# Patient Record
Sex: FEMALE | Race: OTHER | URBAN - METROPOLITAN AREA
[De-identification: names, ages, dates, MRNs, and addresses within clinical notes are randomized per-mention and may not be internally consistent; named-entity substitution may affect disease eponyms.]

---

## 2022-07-09 ENCOUNTER — TELEPHONE ENCOUNTER (OUTPATIENT)
Dept: URBAN - METROPOLITAN AREA CLINIC 121 | Facility: CLINIC | Age: 57
End: 2022-07-09

## 2022-07-09 RX ORDER — LEVOTHYROXINE SODIUM 100 UG/1
TABLET ORAL
Refills: 0 | OUTPATIENT
Start: 2018-01-31 | End: 2018-04-17

## 2022-07-09 RX ORDER — LISINOPRIL AND HYDROCHLOROTHIAZIDE TABLETS 10; 12.5 MG/1; MG/1
TABLET ORAL
Refills: 0 | OUTPATIENT
Start: 2018-04-17 | End: 2018-12-10

## 2022-07-09 RX ORDER — LISINOPRIL AND HYDROCHLOROTHIAZIDE TABLETS 10; 12.5 MG/1; MG/1
TABLET ORAL
Refills: 0 | OUTPATIENT
Start: 2018-02-19 | End: 2018-04-17

## 2022-07-10 ENCOUNTER — TELEPHONE ENCOUNTER (OUTPATIENT)
Dept: URBAN - METROPOLITAN AREA CLINIC 121 | Facility: CLINIC | Age: 57
End: 2022-07-10

## 2022-07-10 RX ORDER — BIMATOPROST 0.1 MG/ML
SOLUTION/ DROPS OPHTHALMIC
Refills: 0 | Status: ACTIVE | COMMUNITY
Start: 2018-11-20

## 2022-07-10 RX ORDER — KRILL/OM-3/DHA/EPA/PHOSPHO/AST 1000-230MG
CAPSULE ORAL
Refills: 0 | Status: ACTIVE | COMMUNITY
Start: 2018-12-04

## 2022-07-10 RX ORDER — LISINOPRIL AND HYDROCHLOROTHIAZIDE TABLETS 10; 12.5 MG/1; MG/1
TABLET ORAL
Refills: 0 | Status: ACTIVE | COMMUNITY
Start: 2018-12-10

## 2022-07-10 RX ORDER — LEVOTHYROXINE SODIUM 100 UG/1
TABLET ORAL
Refills: 0 | Status: ACTIVE | COMMUNITY
Start: 2018-04-17

## 2022-07-10 RX ORDER — LEVOTHYROXINE SODIUM 88 UG/1
TABLET ORAL
Refills: 0 | Status: ACTIVE | COMMUNITY
Start: 2018-11-14

## 2022-07-10 RX ORDER — ESTROGEN,CON/M-PROGEST ACET 0.3-1.5MG
TABLET ORAL
Refills: 0 | Status: ACTIVE | COMMUNITY
Start: 2018-11-16

## 2022-12-12 ENCOUNTER — APPOINTMENT (RX ONLY)
Dept: URBAN - METROPOLITAN AREA CLINIC 147 | Facility: CLINIC | Age: 57
Setting detail: DERMATOLOGY
End: 2022-12-12

## 2022-12-12 DIAGNOSIS — L81.1 CHLOASMA: ICD-10-CM

## 2022-12-12 PROCEDURE — 99203 OFFICE O/P NEW LOW 30 MIN: CPT

## 2022-12-12 PROCEDURE — ? TREATMENT REGIMEN

## 2022-12-12 PROCEDURE — ? DEFER

## 2022-12-12 PROCEDURE — ? COUNSELING

## 2022-12-12 PROCEDURE — ? ADDITIONAL NOTES

## 2022-12-12 PROCEDURE — ? SUNSCREEN RECOMMENDATIONS

## 2022-12-12 ASSESSMENT — LOCATION SIMPLE DESCRIPTION DERM
LOCATION SIMPLE: LEFT CHEEK
LOCATION SIMPLE: RIGHT CHEEK

## 2022-12-12 ASSESSMENT — LOCATION ZONE DERM: LOCATION ZONE: FACE

## 2022-12-12 ASSESSMENT — LOCATION DETAILED DESCRIPTION DERM
LOCATION DETAILED: LEFT INFERIOR CENTRAL MALAR CHEEK
LOCATION DETAILED: RIGHT INFERIOR CENTRAL MALAR CHEEK

## 2022-12-12 NOTE — PROCEDURE: DEFER
Introduction Text (Please End With A Colon): The following procedure was deferred:
Instructions (Optional): Margaret Sheikh for laser
Detail Level: Detailed
Size Of Lesion In Cm (Optional): 0
Procedure To Be Performed At Next Visit: Other

## 2022-12-12 NOTE — HPI: SKIN LESIONS
[No Acute Distress] : no acute distress
[No Respiratory Distress] : no respiratory distress 
[Normal Affect] : the affect was normal
[Normal Insight/Judgement] : insight and judgment were intact
[de-identified] : Audio only.
How Severe Is Your Skin Lesion?: mild
Have Your Skin Lesions Been Treated?: not been treated
Is This A New Presentation, Or A Follow-Up?: Skin Lesions

## 2022-12-12 NOTE — PROCEDURE: SUNSCREEN RECOMMENDATIONS
Products Recommended: Elta MD tinted Clear sunscreen daily, use a physical sunscreen with zinc or titanium
Detail Level: Generalized
General Sunscreen Counseling: I recommended a broad spectrum sunscreen with a SPF of 30 or higher.  I explained that SPF 30 sunscreens block approximately 97 percent of the sun's harmful rays.  Sunscreens should be applied at least 15 minutes prior to expected sun exposure and then every 2 hours after that as long as sun exposure continues. If swimming or exercising sunscreen should be reapplied every 45 minutes to an hour after getting wet or sweating.  One ounce, or the equivalent of a shot glass full of sunscreen, is adequate to protect the skin not covered by a bathing suit. I also recommended a lip balm with a sunscreen as well. Sun protective clothing can be used in lieu of sunscreen but must be worn the entire time you are exposed to the sun's rays.

## 2022-12-12 NOTE — PROCEDURE: ADDITIONAL NOTES
Additional Notes: Pt finish hydroquinone 3 months ago
Render Risk Assessment In Note?: no
Detail Level: Zone

## 2022-12-16 ENCOUNTER — OFFICE VISIT (OUTPATIENT)
Dept: URBAN - METROPOLITAN AREA CLINIC 60 | Facility: CLINIC | Age: 57
End: 2022-12-16
Payer: COMMERCIAL

## 2022-12-16 VITALS
HEART RATE: 76 BPM | RESPIRATION RATE: 20 BRPM | BODY MASS INDEX: 28.32 KG/M2 | HEIGHT: 61 IN | DIASTOLIC BLOOD PRESSURE: 80 MMHG | WEIGHT: 150 LBS | OXYGEN SATURATION: 96 % | TEMPERATURE: 97.6 F | SYSTOLIC BLOOD PRESSURE: 124 MMHG

## 2022-12-16 DIAGNOSIS — K29.60 REFLUX GASTRITIS: ICD-10-CM

## 2022-12-16 DIAGNOSIS — Z12.11 COLON CANCER SCREENING: ICD-10-CM

## 2022-12-16 DIAGNOSIS — K76.0 FATTY LIVER: ICD-10-CM

## 2022-12-16 DIAGNOSIS — Z01.818 ENCOUNTER FOR OTHER PREPROCEDURAL EXAMINATION: ICD-10-CM

## 2022-12-16 PROBLEM — 197321007 FATTY LIVER: Status: ACTIVE | Noted: 2022-12-16

## 2022-12-16 PROCEDURE — 99204 OFFICE O/P NEW MOD 45 MIN: CPT | Performed by: NURSE PRACTITIONER

## 2022-12-16 RX ORDER — BIMATOPROST 0.1 MG/ML
SOLUTION/ DROPS OPHTHALMIC
Refills: 0 | Status: ACTIVE | COMMUNITY
Start: 2018-11-20

## 2022-12-16 RX ORDER — OMEPRAZOLE 40 MG/1
1 CAPSULE 30 MINUTES BEFORE MORNING MEAL CAPSULE, DELAYED RELEASE ORAL ONCE A DAY
Qty: 30 | Refills: 2 | OUTPATIENT
Start: 2022-12-16

## 2022-12-16 RX ORDER — LISINOPRIL AND HYDROCHLOROTHIAZIDE TABLETS 10; 12.5 MG/1; MG/1
TABLET ORAL
Refills: 0 | Status: ACTIVE | COMMUNITY
Start: 2018-12-10

## 2022-12-16 RX ORDER — OMEPRAZOLE 40 MG/1
1 CAPSULE 30 MINUTES BEFORE MORNING MEAL CAPSULE, DELAYED RELEASE ORAL ONCE A DAY
Status: ACTIVE | COMMUNITY

## 2022-12-16 RX ORDER — LEVOTHYROXINE SODIUM 100 UG/1
TABLET ORAL
Refills: 0 | Status: ACTIVE | COMMUNITY
Start: 2018-04-17

## 2022-12-16 NOTE — HPI-HPI
2/18 Patient comes with RUQ pain radiated to her back with high intensity, with a duration of 30 minutes. The pain is intermittent for the last 3 years, Had EGD and colonoscopy by Dr Rodriguez,I do not have the results will ask for records. Only was order omeprazole. Will start Dexilant. And will do Ultrasound and HIDA scan will do referral to Dr Paul CT scan done on May 2017 showed cholelithiasis as possible cause of her pain. Also had adnexal cyst lesion (3.6 by 3.5 cm right adnexal cyst). Will do labs to check LFT and lipase. Now patient is asymptomatic.  4/18:  Patient had HIDA scan which is normal but ultrasound of the abdomen showed fatty liver and cholelithiasis, likely biliary colic pain associated was evaluated by Dr Paul, had cholecystectomy with resolution of the pain, with evidence of a chronic cholecystitis and cholelithiasis in the pathology as per patient. Will need to do diet and exercise to start in few weeks, will follow in 6 months to evaluate  her fatty liver. 12/18:  Patient had Ultrasound with evidence of s/p cholecystectomy, fatty liver. Will do labs needs diet and weight loss. Patient lost 6 lbs since last visit 2 months ago had labs and LFT were normal. Will follow in 6 months. 12/22 12/22 Patient is here today complaining of having symptoms of gastritis and reflux. Also, for her liver follow-up. Patient has medical history of fatty liver, her last follow-up was 4 years ago. Also, requesting screening colonoscopy her last colonoscopy was done more than 6 years ago as per her report had a recall in 5-year.  Patient denies any rectal bleeding or change in her bowel habits. Liver work-up, EGD, colonoscopy, diet and a trial  With PPI.

## 2022-12-23 ENCOUNTER — LAB OUTSIDE AN ENCOUNTER (OUTPATIENT)
Dept: URBAN - METROPOLITAN AREA CLINIC 60 | Facility: CLINIC | Age: 57
End: 2022-12-23

## 2023-01-30 ENCOUNTER — TELEPHONE ENCOUNTER (OUTPATIENT)
Dept: URBAN - METROPOLITAN AREA CLINIC 63 | Facility: CLINIC | Age: 58
End: 2023-01-30

## 2023-02-09 ENCOUNTER — OFFICE VISIT (OUTPATIENT)
Dept: URBAN - METROPOLITAN AREA SURGERY CENTER 4 | Facility: SURGERY CENTER | Age: 58
End: 2023-02-09

## 2023-03-03 ENCOUNTER — OFFICE VISIT (OUTPATIENT)
Dept: URBAN - METROPOLITAN AREA SURGERY CENTER 4 | Facility: SURGERY CENTER | Age: 58
End: 2023-03-03

## 2023-03-03 PROBLEM — 57433008 REFLUX GASTRITIS: Status: ACTIVE | Noted: 2022-12-16

## 2023-03-17 ENCOUNTER — OFFICE VISIT (OUTPATIENT)
Dept: URBAN - METROPOLITAN AREA CLINIC 60 | Facility: CLINIC | Age: 58
End: 2023-03-17

## 2023-06-20 ENCOUNTER — WEB ENCOUNTER (OUTPATIENT)
Dept: URBAN - METROPOLITAN AREA CLINIC 60 | Facility: CLINIC | Age: 58
End: 2023-06-20

## 2023-06-20 ENCOUNTER — OFFICE VISIT (OUTPATIENT)
Dept: URBAN - METROPOLITAN AREA CLINIC 60 | Facility: CLINIC | Age: 58
End: 2023-06-20
Payer: COMMERCIAL

## 2023-06-20 VITALS
BODY MASS INDEX: 28.7 KG/M2 | DIASTOLIC BLOOD PRESSURE: 80 MMHG | HEIGHT: 61 IN | HEART RATE: 81 BPM | WEIGHT: 152 LBS | OXYGEN SATURATION: 98 % | RESPIRATION RATE: 20 BRPM | TEMPERATURE: 98.2 F | SYSTOLIC BLOOD PRESSURE: 120 MMHG

## 2023-06-20 DIAGNOSIS — K76.0 FATTY LIVER: ICD-10-CM

## 2023-06-20 DIAGNOSIS — K29.60 REFLUX GASTRITIS: ICD-10-CM

## 2023-06-20 DIAGNOSIS — Z12.11 COLON CANCER SCREENING: ICD-10-CM

## 2023-06-20 PROCEDURE — 99214 OFFICE O/P EST MOD 30 MIN: CPT | Performed by: NURSE PRACTITIONER

## 2023-06-20 RX ORDER — LEVOTHYROXINE SODIUM 100 UG/1
TABLET ORAL
Refills: 0 | Status: ACTIVE | COMMUNITY
Start: 2018-04-17

## 2023-06-20 RX ORDER — LISINOPRIL AND HYDROCHLOROTHIAZIDE TABLETS 10; 12.5 MG/1; MG/1
TABLET ORAL
Refills: 0 | Status: ACTIVE | COMMUNITY
Start: 2018-12-10

## 2023-06-20 RX ORDER — OMEPRAZOLE 20 MG/1
1 CAPSULE 30 MINUTES BEFORE MORNING MEAL CAPSULE, DELAYED RELEASE ORAL ONCE A DAY
Qty: 30 | Refills: 2 | OUTPATIENT
Start: 2023-06-20

## 2023-06-20 RX ORDER — BIMATOPROST 0.1 MG/ML
SOLUTION/ DROPS OPHTHALMIC
Refills: 0 | Status: ACTIVE | COMMUNITY
Start: 2018-11-20

## 2023-06-20 RX ORDER — SUCRALFATE 1 G/1
1 TABLET ON AN EMPTY STOMACH TABLET ORAL TWICE A DAY
Status: ACTIVE | COMMUNITY

## 2023-06-20 RX ORDER — OMEPRAZOLE 40 MG/1
1 CAPSULE 30 MINUTES BEFORE MORNING MEAL CAPSULE, DELAYED RELEASE ORAL ONCE A DAY
Qty: 30 | Refills: 2 | Status: ACTIVE | COMMUNITY
Start: 2022-12-16

## 2023-06-20 NOTE — HPI-HPI
2/18 Patient comes with RUQ pain radiated to her back with high intensity, with a duration of 30 minutes. The pain is intermittent for the last 3 years, Had EGD and colonoscopy by Dr Rodriguez, I do not have the results will ask for records. Only was order omeprazole. Will start Dexilant. And will do Ultrasound and HIDA scan will do referral to Dr Paul CT scan done on May 2017 showed cholelithiasis as possible cause of her pain. Also had adnexal cyst lesion (3.6 by 3.5 cm right adnexal cyst). Will do labs to check LFT and lipase. Now patient is asymptomatic.  4/18:  Patient had HIDA scan which is normal but ultrasound of the abdomen showed fatty liver and cholelithiasis, likely biliary colic pain associated was evaluated by Dr Paul, had cholecystectomy with resolution of the pain, with evidence of a chronic cholecystitis and cholelithiasis in the pathology as per patient. Will need to do diet and exercise to start in few weeks, will follow in 6 months to evaluate  her fatty liver. 12/18:  Patient had Ultrasound with evidence of s/p cholecystectomy, fatty liver. Will do labs needs diet and weight loss. Patient lost 6 lbs since last visit 2 months ago had labs and LFT were normal. Will follow in 6 months. 12/22 12/22 Patient is here today complaining of having symptoms of gastritis and reflux. Also, for her liver follow-up. Patient has medical history of fatty liver, her last follow-up was 4 years ago. Also, requesting screening colonoscopy her last colonoscopy was done more than 6 years ago as per her report had a recall in 5-year.  Patient denies any rectal bleeding or change in her bowel habits. Liver work-up, EGD, colonoscopy, diet and a trial  With PPI. 6/23 Patient here today for her surveillance colonoscopy.  Last colonoscopy was done more than 6 years ago, she also is complaining of gastritis and reflux.  Her symptoms are chronic but, now seems to be worse. Patient also has medical history of fatty liver. Patient will resume diet and treatment for gastritis and reflux, EGD. Liver work-up. Colonoscopy.

## 2023-06-27 ENCOUNTER — LAB OUTSIDE AN ENCOUNTER (OUTPATIENT)
Dept: URBAN - METROPOLITAN AREA CLINIC 60 | Facility: CLINIC | Age: 58
End: 2023-06-27

## 2023-07-20 ENCOUNTER — LAB OUTSIDE AN ENCOUNTER (OUTPATIENT)
Dept: URBAN - METROPOLITAN AREA CLINIC 63 | Facility: CLINIC | Age: 58
End: 2023-07-20

## 2023-07-21 LAB
A/G RATIO: 1.6
ABSOLUTE BASOPHILS: 21
ABSOLUTE EOSINOPHILS: 59
ABSOLUTE LYMPHOCYTES: 1714
ABSOLUTE MONOCYTES: 399
ABSOLUTE NEUTROPHILS: 2008
ALBUMIN: 4.4
ALKALINE PHOSPHATASE: 120
ALT (SGPT): 29
AST (SGOT): 22
BASOPHILS: 0.5
BILIRUBIN, TOTAL: 0.5
BUN/CREATININE RATIO: (no result)
BUN: 11
CALCIUM: 9.2
CARBON DIOXIDE, TOTAL: 28
CHLORIDE: 106
CHOL/HDLC RATIO: 4
CHOLESTEROL, TOTAL: 198
CREATININE: 0.67
EGFR: 101
EOSINOPHILS: 1.4
GLOBULIN, TOTAL: 2.8
GLUCOSE: 100
HDL CHOLESTEROL: 49
HEMATOCRIT: 30.6
HEMOGLOBIN: 10
INR: 1
LDL CHOLESTEROL CALC: 119
LYMPHOCYTES: 40.8
MCH: 27.2
MCHC: 32.7
MCV: 83.2
MONOCYTES: 9.5
MPV: 11.3
NEUTROPHILS: 47.8
NON HDL CHOLESTEROL: 149
PLATELET COUNT: 248
POTASSIUM: 4.2
PROTEIN, TOTAL: 7.2
PT: 10.3
RDW: 14.9
RED BLOOD CELL COUNT: 3.68
SODIUM: 142
TRIGLYCERIDES: 182
WHITE BLOOD CELL COUNT: 4.2

## 2023-07-25 ENCOUNTER — TELEPHONE ENCOUNTER (OUTPATIENT)
Dept: URBAN - METROPOLITAN AREA CLINIC 63 | Facility: CLINIC | Age: 58
End: 2023-07-25

## 2023-07-27 ENCOUNTER — TELEPHONE ENCOUNTER (OUTPATIENT)
Dept: URBAN - METROPOLITAN AREA CLINIC 63 | Facility: CLINIC | Age: 58
End: 2023-07-27

## 2023-08-04 ENCOUNTER — OUT OF OFFICE VISIT (OUTPATIENT)
Dept: URBAN - METROPOLITAN AREA SURGERY CENTER 4 | Facility: SURGERY CENTER | Age: 58
End: 2023-08-04
Payer: COMMERCIAL

## 2023-08-04 ENCOUNTER — CLAIMS CREATED FROM THE CLAIM WINDOW (OUTPATIENT)
Dept: URBAN - METROPOLITAN AREA CLINIC 4 | Facility: CLINIC | Age: 58
End: 2023-08-04
Payer: COMMERCIAL

## 2023-08-04 DIAGNOSIS — K64.1 GRADE II HEMORRHOIDS: ICD-10-CM

## 2023-08-04 DIAGNOSIS — Z86.010 COLON POLYP HISTORY: ICD-10-CM

## 2023-08-04 DIAGNOSIS — R19.8 OTHER SPECIFIED SYMPTOMS AND SIGNS INVOLVING THE DIGESTIVE SYSTEM AND ABDOMEN: ICD-10-CM

## 2023-08-04 DIAGNOSIS — D12.0 POLYP OF CECUM: ICD-10-CM

## 2023-08-04 DIAGNOSIS — K29.50 CHRONIC GASTRITIS WITHOUT BLEEDING: ICD-10-CM

## 2023-08-04 DIAGNOSIS — K44.9 HIATAL HERNIA: ICD-10-CM

## 2023-08-04 DIAGNOSIS — K21.9 GASTRO-ESOPHAGEAL REFLUX DISEASE WITHOUT ESOPHAGITIS: ICD-10-CM

## 2023-08-04 DIAGNOSIS — K21.9 ESOPHAGEAL REFLUX: ICD-10-CM

## 2023-08-04 DIAGNOSIS — K31.89 OTHER DISEASES OF STOMACH AND DUODENUM: ICD-10-CM

## 2023-08-04 DIAGNOSIS — K62.1 RECTAL POLYP: ICD-10-CM

## 2023-08-04 DIAGNOSIS — Z86.010 ADENOMAS PERSONAL HISTORY OF COLONIC POLYPS: ICD-10-CM

## 2023-08-04 DIAGNOSIS — K63.5 BENIGN COLON POLYPS: ICD-10-CM

## 2023-08-04 PROCEDURE — 00813 ANES UPR LWR GI NDSC PX: CPT | Performed by: NURSE ANESTHETIST, CERTIFIED REGISTERED

## 2023-08-04 PROCEDURE — 88305 TISSUE EXAM BY PATHOLOGIST: CPT | Performed by: PATHOLOGY

## 2023-08-04 PROCEDURE — 45380 COLONOSCOPY AND BIOPSY: CPT | Performed by: INTERNAL MEDICINE

## 2023-08-04 PROCEDURE — 88342 IMHCHEM/IMCYTCHM 1ST ANTB: CPT | Performed by: PATHOLOGY

## 2023-08-04 PROCEDURE — 43239 EGD BIOPSY SINGLE/MULTIPLE: CPT | Performed by: INTERNAL MEDICINE

## 2023-08-04 PROCEDURE — 88312 SPECIAL STAINS GROUP 1: CPT | Performed by: PATHOLOGY

## 2023-08-04 RX ORDER — BIMATOPROST 0.1 MG/ML
SOLUTION/ DROPS OPHTHALMIC
Refills: 0 | Status: ACTIVE | COMMUNITY
Start: 2018-11-20

## 2023-08-04 RX ORDER — LEVOTHYROXINE SODIUM 100 UG/1
TABLET ORAL
Refills: 0 | Status: ACTIVE | COMMUNITY
Start: 2018-04-17

## 2023-08-04 RX ORDER — LISINOPRIL AND HYDROCHLOROTHIAZIDE TABLETS 10; 12.5 MG/1; MG/1
TABLET ORAL
Refills: 0 | Status: ACTIVE | COMMUNITY
Start: 2018-12-10

## 2023-08-04 RX ORDER — OMEPRAZOLE 20 MG/1
1 CAPSULE 30 MINUTES BEFORE MORNING MEAL CAPSULE, DELAYED RELEASE ORAL ONCE A DAY
Qty: 30 | Refills: 2 | Status: ACTIVE | COMMUNITY
Start: 2023-06-20

## 2023-08-04 RX ORDER — OMEPRAZOLE 40 MG/1
1 CAPSULE 30 MINUTES BEFORE MORNING MEAL CAPSULE, DELAYED RELEASE ORAL ONCE A DAY
Qty: 30 | Refills: 2 | Status: ACTIVE | COMMUNITY
Start: 2022-12-16

## 2023-08-04 RX ORDER — SUCRALFATE 1 G/1
1 TABLET ON AN EMPTY STOMACH TABLET ORAL TWICE A DAY
Status: ACTIVE | COMMUNITY

## 2023-08-18 ENCOUNTER — OFFICE VISIT (OUTPATIENT)
Dept: URBAN - METROPOLITAN AREA CLINIC 60 | Facility: CLINIC | Age: 58
End: 2023-08-18
Payer: COMMERCIAL

## 2023-08-18 VITALS
OXYGEN SATURATION: 98 % | DIASTOLIC BLOOD PRESSURE: 74 MMHG | TEMPERATURE: 98.1 F | HEIGHT: 61 IN | RESPIRATION RATE: 20 BRPM | SYSTOLIC BLOOD PRESSURE: 118 MMHG | BODY MASS INDEX: 27.75 KG/M2 | WEIGHT: 147 LBS | HEART RATE: 67 BPM

## 2023-08-18 DIAGNOSIS — D12.6 ADENOMATOUS POLYP OF COLON, UNSPECIFIED PART OF COLON: ICD-10-CM

## 2023-08-18 DIAGNOSIS — K64.1 GRADE II HEMORRHOIDS: ICD-10-CM

## 2023-08-18 DIAGNOSIS — K44.9 HIATAL HERNIA: ICD-10-CM

## 2023-08-18 DIAGNOSIS — K29.50 CHRONIC GASTRITIS WITHOUT BLEEDING, UNSPECIFIED GASTRITIS TYPE: ICD-10-CM

## 2023-08-18 PROBLEM — 8493009: Status: ACTIVE | Noted: 2023-08-18

## 2023-08-18 PROCEDURE — 99214 OFFICE O/P EST MOD 30 MIN: CPT | Performed by: NURSE PRACTITIONER

## 2023-08-18 RX ORDER — LISINOPRIL AND HYDROCHLOROTHIAZIDE TABLETS 10; 12.5 MG/1; MG/1
TABLET ORAL
Refills: 0 | Status: ACTIVE | COMMUNITY
Start: 2018-12-10

## 2023-08-18 RX ORDER — BIMATOPROST 0.1 MG/ML
SOLUTION/ DROPS OPHTHALMIC
Refills: 0 | Status: ACTIVE | COMMUNITY
Start: 2018-11-20

## 2023-08-18 RX ORDER — FAMOTIDINE 20 MG/1
1 TABLET AT BEDTIME AS NEEDED TABLET, FILM COATED ORAL ONCE A DAY
Qty: 90 TABLET | Refills: 0 | OUTPATIENT
Start: 2023-08-18

## 2023-08-18 RX ORDER — LEVOTHYROXINE SODIUM 100 UG/1
TABLET ORAL
Refills: 0 | Status: ACTIVE | COMMUNITY
Start: 2018-04-17

## 2023-08-18 NOTE — HPI-HPI
2/18 Patient comes with RUQ pain radiated to her back with high intensity, with a duration of 30 minutes. The pain is intermittent for the last 3 years, Had EGD and colonoscopy by Dr Rodriguez, I do not have the results will ask for records. Only was order omeprazole. Will start Dexilant. And will do Ultrasound and HIDA scan will do referral to Dr Paul CT scan done on May 2017 showed cholelithiasis as possible cause of her pain. Also had adnexal cyst lesion (3.6 by 3.5 cm right adnexal cyst). Will do labs to check LFT and lipase. Now patient is asymptomatic.  4/18:  Patient had HIDA scan which is normal but ultrasound of the abdomen showed fatty liver and cholelithiasis, likely biliary colic pain associated was evaluated by Dr Paul, had cholecystectomy with resolution of the pain, with evidence of a chronic cholecystitis and cholelithiasis in the pathology as per patient. Will need to do diet and exercise to start in few weeks, will follow in 6 months to evaluate  her fatty liver. 12/18:  Patient had Ultrasound with evidence of s/p cholecystectomy, fatty liver. Will do labs needs diet and weight loss. Patient lost 6 lbs since last visit 2 months ago had labs and LFT were normal. Will follow in 6 months. 12/22 12/22 Patient is here today complaining of having symptoms of gastritis and reflux. Also, for her liver follow-up. Patient has medical history of fatty liver, her last follow-up was 4 years ago. Also, requesting screening colonoscopy her last colonoscopy was done more than 6 years ago as per her report had a recall in 5-year.  Patient denies any rectal bleeding or change in her bowel habits. Liver work-up, EGD, colonoscopy, diet and a trial  With PPI. 6/23 Patient here today for her surveillance colonoscopy.  Last colonoscopy was done more than 6 years ago, she also is complaining of gastritis and reflux.  Her symptoms are chronic but, now seems to be worse. Patient also has medical history of fatty liver. Patient will resume diet and treatment for gastritis and reflux, EGD. Liver work-up. Colonoscopy. 8/23 Shows evidence of a 5 mm tubular adenoma polyp into the cecum, a 5 mm tubular adenoma polyp into the rectum, and internal hemorrhoids.  EGD shows evidence of small hiatal hernia, chronic gastritis, normal esophagus and normal examined duodenum.  Biopsy results duodenal mucosa with no significant abnormality.  Stomach, antrum and body biopsy shows chronic inactive gastritis with some histological changes suggestive of treated H. pylori gastritis.  Negative for H. pylori organism, intestinal metaplasia, dysplasia, or malignancy.  Lower third esophageal mucosa with reflux type changes.  Negative for Merritt esophagus, dysplasia, malignancy. Next colonoscopy in 5-year continue with diet.  DC PPI and Carafate.  Famotidine 20 mg once a day for 4 weeks.

## 2023-11-16 ENCOUNTER — TELEPHONE ENCOUNTER (OUTPATIENT)
Dept: URBAN - METROPOLITAN AREA CLINIC 64 | Facility: CLINIC | Age: 58
End: 2023-11-16

## 2023-11-17 ENCOUNTER — OFFICE VISIT (OUTPATIENT)
Dept: URBAN - METROPOLITAN AREA CLINIC 60 | Facility: CLINIC | Age: 58
End: 2023-11-17

## 2023-11-17 RX ORDER — LEVOTHYROXINE SODIUM 100 UG/1
TABLET ORAL
Refills: 0 | Status: ACTIVE | COMMUNITY
Start: 2018-04-17

## 2023-11-17 RX ORDER — BIMATOPROST 0.1 MG/ML
SOLUTION/ DROPS OPHTHALMIC
Refills: 0 | Status: ACTIVE | COMMUNITY
Start: 2018-11-20

## 2023-11-17 RX ORDER — LISINOPRIL AND HYDROCHLOROTHIAZIDE TABLETS 10; 12.5 MG/1; MG/1
TABLET ORAL
Refills: 0 | Status: ACTIVE | COMMUNITY
Start: 2018-12-10

## 2023-11-17 RX ORDER — FAMOTIDINE 20 MG/1
1 TABLET AT BEDTIME AS NEEDED TABLET, FILM COATED ORAL ONCE A DAY
Qty: 90 TABLET | Refills: 0 | Status: ACTIVE | COMMUNITY
Start: 2023-08-18

## 2023-12-27 ENCOUNTER — OFFICE VISIT (OUTPATIENT)
Dept: URBAN - METROPOLITAN AREA CLINIC 60 | Facility: CLINIC | Age: 58
End: 2023-12-27

## 2023-12-27 RX ORDER — FAMOTIDINE 20 MG/1
1 TABLET AT BEDTIME AS NEEDED TABLET, FILM COATED ORAL ONCE A DAY
Qty: 90 TABLET | Refills: 0 | Status: ACTIVE | COMMUNITY
Start: 2023-08-18

## 2023-12-27 RX ORDER — BIMATOPROST 0.1 MG/ML
SOLUTION/ DROPS OPHTHALMIC
Refills: 0 | Status: ACTIVE | COMMUNITY
Start: 2018-11-20

## 2023-12-27 RX ORDER — LISINOPRIL AND HYDROCHLOROTHIAZIDE TABLETS 10; 12.5 MG/1; MG/1
TABLET ORAL
Refills: 0 | Status: ACTIVE | COMMUNITY
Start: 2018-12-10

## 2023-12-27 RX ORDER — LEVOTHYROXINE SODIUM 100 UG/1
TABLET ORAL
Refills: 0 | Status: ACTIVE | COMMUNITY
Start: 2018-04-17

## 2024-02-06 ENCOUNTER — OV EP (OUTPATIENT)
Dept: URBAN - METROPOLITAN AREA CLINIC 60 | Facility: CLINIC | Age: 59
End: 2024-02-06

## 2024-02-06 RX ORDER — FAMOTIDINE 20 MG/1
1 TABLET AT BEDTIME AS NEEDED TABLET, FILM COATED ORAL ONCE A DAY
Qty: 90 TABLET | Refills: 0 | Status: ACTIVE | COMMUNITY
Start: 2023-08-18

## 2024-02-06 RX ORDER — LEVOTHYROXINE SODIUM 100 UG/1
TABLET ORAL
Refills: 0 | Status: ACTIVE | COMMUNITY
Start: 2018-04-17

## 2024-02-06 RX ORDER — LISINOPRIL AND HYDROCHLOROTHIAZIDE TABLETS 10; 12.5 MG/1; MG/1
TABLET ORAL
Refills: 0 | Status: ACTIVE | COMMUNITY
Start: 2018-12-10

## 2024-02-06 RX ORDER — BIMATOPROST 0.1 MG/ML
SOLUTION/ DROPS OPHTHALMIC
Refills: 0 | Status: ACTIVE | COMMUNITY
Start: 2018-11-20